# Patient Record
Sex: MALE | Race: WHITE | NOT HISPANIC OR LATINO | URBAN - METROPOLITAN AREA
[De-identification: names, ages, dates, MRNs, and addresses within clinical notes are randomized per-mention and may not be internally consistent; named-entity substitution may affect disease eponyms.]

---

## 2020-07-25 ENCOUNTER — TELEPHONE ENCOUNTER (OUTPATIENT)
Dept: URBAN - METROPOLITAN AREA CLINIC 13 | Facility: CLINIC | Age: 85
End: 2020-07-25

## 2020-07-25 RX ORDER — ESOMEPRAZOLE MAGNESIUM 40 MG
TAKE (1) CAPSULE TWICE DAILY CAPSULE,DELAYED RELEASE (ENTERIC COATED) ORAL
Qty: 180 | Refills: 2 | OUTPATIENT
End: 2016-08-29

## 2020-07-25 RX ORDER — ATORVASTATIN CALCIUM 40 MG/1
TAKE 1 TABLET DAILY TABLET, FILM COATED ORAL
Refills: 0 | OUTPATIENT
End: 2018-02-13

## 2020-07-25 RX ORDER — TRAZODONE HYDROCHLORIDE 50 MG/1
TAKE 1 TABLET AT BEDTIME TABLET ORAL
Refills: 0 | OUTPATIENT
End: 2018-02-13

## 2020-07-25 RX ORDER — UBIDECARENONE 100 MG
TAKE 1 CAPSULE TWICE DAILY CAPSULE ORAL
Refills: 0 | OUTPATIENT
End: 2016-08-29

## 2020-07-25 RX ORDER — PANTOPRAZOLE SODIUM 40 MG/1
TAKE 1 TABLET DAILY TABLET, DELAYED RELEASE ORAL
Qty: 1 | Refills: 3 | OUTPATIENT
Start: 2012-12-21 | End: 2016-08-29

## 2020-07-25 RX ORDER — DONEPEZIL HYDROCHLORIDE 10 MG/1
TAKE 1 TABLET DAILY TABLET, FILM COATED ORAL
Refills: 0 | OUTPATIENT
End: 2018-02-13

## 2020-07-25 RX ORDER — ATORVASTATIN CALCIUM 40 MG/1
TAKE 1 TABLET DAILY AS DIRECTED TABLET, FILM COATED ORAL
Qty: 30 | Refills: 0 | OUTPATIENT
Start: 2012-03-20 | End: 2013-01-21

## 2020-07-25 RX ORDER — BESIFLOXACIN HCL 0.6 %
INSTILL 1 DROP 3 TIMES DAILY SUSPENSION, DROPS(FINAL DOSAGE FORM)(ML) OPHTHALMIC (EYE)
Refills: 0 | OUTPATIENT
Start: 2014-11-12 | End: 2018-02-13

## 2020-07-25 RX ORDER — HYDROCORTISONE ACETATE 25 MG
INSERT 1 SUPPOSITORY RECTALLY AT BEDTIME AS NEEDED SUPPOSITORY, RECTAL RECTAL
Qty: 1 | Refills: 6 | OUTPATIENT
Start: 2013-07-19 | End: 2014-03-25

## 2020-07-25 RX ORDER — HYDROCHLOROTHIAZIDE 12.5 MG/1
TABLET ORAL
Qty: 90 | Refills: 0 | OUTPATIENT
Start: 2012-03-12 | End: 2018-02-13

## 2020-07-25 RX ORDER — SENNOSIDES 8.6 MG
TABLET ORAL
Refills: 0 | OUTPATIENT
End: 2012-12-21

## 2020-07-25 RX ORDER — INDOMETHACIN 50 MG/1
CAPSULE ORAL
Qty: 30 | Refills: 0 | OUTPATIENT
Start: 2013-01-14 | End: 2013-07-19

## 2020-07-25 RX ORDER — ALLOPURINOL 300 MG/1
TABLET ORAL
Qty: 90 | Refills: 0 | OUTPATIENT
Start: 2013-03-18 | End: 2018-02-13

## 2020-07-25 RX ORDER — ATORVASTATIN CALCIUM 80 MG/1
TABLET, FILM COATED ORAL
Qty: 90 | Refills: 0 | OUTPATIENT
Start: 2012-10-04 | End: 2014-03-25

## 2020-07-26 ENCOUNTER — TELEPHONE ENCOUNTER (OUTPATIENT)
Dept: URBAN - METROPOLITAN AREA CLINIC 13 | Facility: CLINIC | Age: 85
End: 2020-07-26

## 2020-07-26 RX ORDER — HYDROCHLOROTHIAZIDE 12.5 MG/1
TABLET ORAL
Qty: 90 | Refills: 0 | Status: ACTIVE | COMMUNITY
Start: 2012-03-12

## 2020-07-26 RX ORDER — MELOXICAM 7.5 MG/1
TABLET ORAL
Qty: 60 | Refills: 0 | Status: ACTIVE | COMMUNITY
Start: 2012-08-13

## 2020-07-26 RX ORDER — ZOLPIDEM TARTRATE 10 MG/1
TABLET, FILM COATED ORAL
Qty: 90 | Refills: 0 | Status: ACTIVE | COMMUNITY
Start: 2014-06-02

## 2020-07-26 RX ORDER — CLOPIDOGREL BISULFATE 75 MG
TABLET ORAL
Qty: 30 | Refills: 0 | Status: ACTIVE | COMMUNITY
Start: 2012-03-20

## 2020-07-26 RX ORDER — DESONIDE 0.5 MG/ML
LOTION TOPICAL
Qty: 59 | Refills: 0 | Status: ACTIVE | COMMUNITY
Start: 2012-07-06

## 2020-07-26 RX ORDER — LISINOPRIL AND HYDROCHLOROTHIAZIDE TABLETS 20; 12.5 MG/1; MG/1
TAKE 1 TABLET DAILY TABLET ORAL
Refills: 0 | Status: ACTIVE | COMMUNITY

## 2020-07-26 RX ORDER — CLARITHROMYCIN 500 MG/1
TABLET, FILM COATED ORAL
Qty: 28 | Refills: 0 | Status: ACTIVE | COMMUNITY
Start: 2012-10-04

## 2020-07-26 RX ORDER — AMOXICILLIN 500 MG/1
CAPSULE ORAL
Qty: 56 | Refills: 0 | Status: ACTIVE | COMMUNITY
Start: 2012-10-04

## 2020-07-26 RX ORDER — LORAZEPAM 0.5 MG/1
TABLET ORAL
Refills: 0 | Status: ACTIVE | COMMUNITY

## 2020-07-26 RX ORDER — NEBIVOLOL HYDROCHLORIDE 5 MG/1
TAKE 1 TABLET DAILY TABLET ORAL
Refills: 0 | Status: ACTIVE | COMMUNITY

## 2020-07-26 RX ORDER — ZOLPIDEM TARTRATE 10 MG/1
TABLET, FILM COATED ORAL
Qty: 30 | Refills: 0 | Status: ACTIVE | COMMUNITY
Start: 2013-01-22

## 2020-07-26 RX ORDER — ISOSORBIDE MONONITRATE 30 MG/1
TABLET, EXTENDED RELEASE ORAL
Qty: 30 | Refills: 0 | Status: ACTIVE | COMMUNITY
Start: 2012-03-20

## 2020-07-26 RX ORDER — DUREZOL 0.5 MG/ML
EMULSION OPHTHALMIC
Qty: 5 | Refills: 0 | Status: ACTIVE | COMMUNITY
Start: 2014-11-12

## 2020-07-26 RX ORDER — LANSOPRAZOLE 30 MG/1
CAPSULE, DELAYED RELEASE ORAL
Qty: 28 | Refills: 0 | Status: ACTIVE | COMMUNITY
Start: 2012-10-04

## 2020-07-26 RX ORDER — ERYTHROMYCIN 20 MG/G
GEL TOPICAL
Qty: 60 | Refills: 0 | Status: ACTIVE | COMMUNITY
Start: 2014-12-08

## 2020-07-26 RX ORDER — ZOLPIDEM TARTRATE 10 MG/1
TABLET, FILM COATED ORAL
Qty: 30 | Refills: 0 | Status: ACTIVE | COMMUNITY
Start: 2013-05-20

## 2020-07-26 RX ORDER — ZOLPIDEM TARTRATE 10 MG/1
TAKE 1 TABLET AT BEDTIME AS NEEDED TABLET, FILM COATED ORAL
Refills: 0 | Status: ACTIVE | COMMUNITY

## 2020-07-26 RX ORDER — DOXYCYCLINE HYCLATE 100 MG/1
TABLET ORAL
Qty: 20 | Refills: 0 | Status: ACTIVE | COMMUNITY
Start: 2013-12-15

## 2020-07-26 RX ORDER — SERTRALINE HCL 50 MG
TAKE 1 TABLET DAILY AS DIRECTED TABLET ORAL
Refills: 0 | Status: ACTIVE | COMMUNITY

## 2020-07-26 RX ORDER — ZOLPIDEM TARTRATE 10 MG/1
TABLET, FILM COATED ORAL
Qty: 30 | Refills: 0 | Status: ACTIVE | COMMUNITY
Start: 2011-09-16

## 2020-07-26 RX ORDER — CEPHALEXIN 500 MG/1
CAPSULE ORAL
Qty: 40 | Refills: 0 | Status: ACTIVE | COMMUNITY
Start: 2014-01-27

## 2020-07-26 RX ORDER — CEPHALEXIN 500 MG/1
CAPSULE ORAL
Qty: 30 | Refills: 0 | Status: ACTIVE | COMMUNITY
Start: 2014-12-08

## 2020-07-26 RX ORDER — DONEPEZIL HYDROCHLORIDE 10 MG/1
TAKE 1 TABLET DAILY AS DIRECTED TABLET, FILM COATED ORAL
Refills: 0 | Status: ACTIVE | COMMUNITY

## 2020-07-26 RX ORDER — HYDROCODONE BITARTRATE AND ACETAMINOPHEN 5; 325 MG/1; MG/1
TABLET ORAL
Qty: 15 | Refills: 0 | Status: ACTIVE | COMMUNITY
Start: 2014-01-27

## 2020-07-26 RX ORDER — CALCIUM POLYCARBOPHIL 625 MG
TABLET ORAL
Refills: 0 | Status: ACTIVE | COMMUNITY

## 2020-07-26 RX ORDER — ATORVASTATIN CALCIUM 80 MG/1
TABLET, FILM COATED ORAL
Qty: 90 | Refills: 0 | Status: ACTIVE | COMMUNITY
Start: 2012-10-04

## 2020-07-26 RX ORDER — FLUOROURACIL 50 MG/G
CREAM TOPICAL
Qty: 40 | Refills: 0 | Status: ACTIVE | COMMUNITY
Start: 2014-07-09

## 2020-07-26 RX ORDER — ZOLPIDEM TARTRATE 10 MG/1
TABLET, FILM COATED ORAL
Qty: 90 | Refills: 0 | Status: ACTIVE | COMMUNITY
Start: 2014-02-03

## 2021-06-18 ENCOUNTER — OFFICE VISIT (OUTPATIENT)
Dept: URBAN - METROPOLITAN AREA CLINIC 113 | Facility: CLINIC | Age: 86
End: 2021-06-18
Payer: MEDICARE

## 2021-06-18 ENCOUNTER — WEB ENCOUNTER (OUTPATIENT)
Dept: URBAN - METROPOLITAN AREA CLINIC 113 | Facility: CLINIC | Age: 86
End: 2021-06-18

## 2021-06-18 VITALS
WEIGHT: 129.5 LBS | HEART RATE: 56 BPM | SYSTOLIC BLOOD PRESSURE: 99 MMHG | BODY MASS INDEX: 19.63 KG/M2 | HEIGHT: 68 IN | TEMPERATURE: 97.3 F | DIASTOLIC BLOOD PRESSURE: 58 MMHG | RESPIRATION RATE: 20 BRPM

## 2021-06-18 DIAGNOSIS — R63.4 WEIGHT LOSS: ICD-10-CM

## 2021-06-18 DIAGNOSIS — R10.13 EPIGASTRIC ABDOMINAL PAIN: ICD-10-CM

## 2021-06-18 DIAGNOSIS — R68.81 EARLY SATIETY: ICD-10-CM

## 2021-06-18 PROCEDURE — 74177 CT ABD & PELVIS W/CONTRAST: CPT | Performed by: INTERNAL MEDICINE

## 2021-06-18 PROCEDURE — 99204 OFFICE O/P NEW MOD 45 MIN: CPT | Performed by: INTERNAL MEDICINE

## 2021-06-18 RX ORDER — FLUOROURACIL 50 MG/G
CREAM TOPICAL
Qty: 40 | Refills: 0 | COMMUNITY
Start: 2014-07-09

## 2021-06-18 RX ORDER — CLARITHROMYCIN 500 MG/1
TABLET, FILM COATED ORAL
Qty: 28 | Refills: 0 | COMMUNITY
Start: 2012-10-04

## 2021-06-18 RX ORDER — CITALOPRAM HYDROBROMIDE 10 MG/1
1 TABLET TABLET, FILM COATED ORAL ONCE A DAY
Status: ACTIVE | COMMUNITY

## 2021-06-18 RX ORDER — ISOSORBIDE MONONITRATE 30 MG/1
TABLET, EXTENDED RELEASE ORAL
Qty: 30 | Refills: 0 | COMMUNITY
Start: 2012-03-20

## 2021-06-18 RX ORDER — ZOLPIDEM TARTRATE 10 MG/1
TAKE 1 TABLET AT BEDTIME AS NEEDED TABLET, FILM COATED ORAL
Refills: 0 | Status: ACTIVE | COMMUNITY

## 2021-06-18 RX ORDER — DOXYCYCLINE HYCLATE 100 MG/1
TABLET ORAL
Qty: 20 | Refills: 0 | COMMUNITY
Start: 2013-12-15

## 2021-06-18 RX ORDER — CLOPIDOGREL BISULFATE 75 MG
TABLET ORAL
Qty: 30 | Refills: 0 | COMMUNITY
Start: 2012-03-20

## 2021-06-18 RX ORDER — LANSOPRAZOLE 30 MG/1
CAPSULE, DELAYED RELEASE ORAL
Qty: 28 | Refills: 0 | COMMUNITY
Start: 2012-10-04

## 2021-06-18 RX ORDER — CALCIUM POLYCARBOPHIL 625 MG
2 TABLETS AS NEEDED TABLET ORAL THREE TIMES A DAY
Status: ACTIVE | COMMUNITY

## 2021-06-18 RX ORDER — ZOLPIDEM TARTRATE 10 MG/1
TABLET, FILM COATED ORAL
Qty: 30 | Refills: 0 | COMMUNITY
Start: 2011-09-16

## 2021-06-18 RX ORDER — DESONIDE 0.5 MG/ML
LOTION TOPICAL
Qty: 59 | Refills: 0 | COMMUNITY
Start: 2012-07-06

## 2021-06-18 RX ORDER — ATORVASTATIN CALCIUM 80 MG/1
TABLET, FILM COATED ORAL
Qty: 90 | Refills: 0 | COMMUNITY
Start: 2012-10-04

## 2021-06-18 RX ORDER — ERYTHROMYCIN 20 MG/G
GEL TOPICAL
Qty: 60 | Refills: 0 | COMMUNITY
Start: 2014-12-08

## 2021-06-18 RX ORDER — LISINOPRIL AND HYDROCHLOROTHIAZIDE TABLETS 20; 12.5 MG/1; MG/1
TAKE 1 TABLET DAILY TABLET ORAL
Refills: 0 | Status: ACTIVE | COMMUNITY

## 2021-06-18 RX ORDER — DUREZOL 0.5 MG/ML
EMULSION OPHTHALMIC
Qty: 5 | Refills: 0 | COMMUNITY
Start: 2014-11-12

## 2021-06-18 RX ORDER — LORAZEPAM 0.5 MG/1
TABLET ORAL
Refills: 0 | COMMUNITY

## 2021-06-18 RX ORDER — HYDROCODONE BITARTRATE AND ACETAMINOPHEN 5; 325 MG/1; MG/1
TABLET ORAL
Qty: 15 | Refills: 0 | COMMUNITY
Start: 2014-01-27

## 2021-06-18 RX ORDER — CEPHALEXIN 500 MG/1
CAPSULE ORAL
Qty: 40 | Refills: 0 | COMMUNITY
Start: 2014-01-27

## 2021-06-18 RX ORDER — AMLODIPINE BESYLATE 10 MG/1
1 TABLET TABLET ORAL ONCE A DAY
Status: ACTIVE | COMMUNITY

## 2021-06-18 RX ORDER — SERTRALINE HCL 50 MG
TAKE 1 TABLET DAILY AS DIRECTED TABLET ORAL
Refills: 0 | COMMUNITY

## 2021-06-18 RX ORDER — NEBIVOLOL HYDROCHLORIDE 5 MG/1
TAKE 1 TABLET DAILY TABLET ORAL
Refills: 0 | Status: ACTIVE | COMMUNITY

## 2021-06-18 RX ORDER — CALCIUM POLYCARBOPHIL 625 MG
TABLET ORAL
Refills: 0 | COMMUNITY

## 2021-06-18 RX ORDER — DONEPEZIL HYDROCHLORIDE 10 MG/1
TAKE 1 TABLET DAILY AS DIRECTED TABLET, FILM COATED ORAL
Refills: 0 | Status: ACTIVE | COMMUNITY

## 2021-06-18 RX ORDER — AMOXICILLIN 500 MG/1
CAPSULE ORAL
Qty: 56 | Refills: 0 | COMMUNITY
Start: 2012-10-04

## 2021-06-18 RX ORDER — HYDROCHLOROTHIAZIDE 12.5 MG/1
TABLET ORAL
Qty: 90 | Refills: 0 | COMMUNITY
Start: 2012-03-12

## 2021-06-18 RX ORDER — MELOXICAM 7.5 MG/1
TABLET ORAL
Qty: 60 | Refills: 0 | COMMUNITY
Start: 2012-08-13

## 2021-06-18 NOTE — HPI-TODAY'S VISIT:
86-year-old male with history of GERD, diverticulosis coli, benign hepatic hemangiomas, and mild anemia, presenting for unintentional weight loss. He was last seen in the office in 2018.  GERD symptoms were well managed with as needed Zantac.  He was to continue daily fiber supplementation due to history of moderate diverticulosis.  Regarding colon cancer screening further colonoscopy examinations were deferred due to his advanced age.  There was no personal history of colon polyps or family history of GI malignancy.  He is down 30 pounds since we last saw him in 2018. In comparison to notes from his PCP, he is down 15 pounds since January 2021. He reports that he gets full easily. He does not have interest in eating food. He denies any dysgeusia. He ate half of a donut this morning, and was full. There is no dysphagia. No heartburn. He complains of upper abdominal pain when he eats. He complains of a dull pain in the upper abdomen. The pain is unchanged by passing gas or having a bowel movement. The pain is intermittent and is not always after meals. He is eating a great deal of fruit, which results in increasing diarrhea. There is no nausea or vomiting currently. He did have some vomiting in March when his symptoms first begain. He has bowel movements daily, 2 to 3 times per day. No red blood per rectum.

## 2021-06-19 LAB
A/G RATIO: 1
ALBUMIN: 2.9
ALKALINE PHOSPHATASE: 81
ALT (SGPT): 7
AMYLASE: 88
AST (SGOT): 16
BASO (ABSOLUTE): 0.1
BASOS: 1
BILIRUBIN, TOTAL: <0.2
BUN/CREATININE RATIO: 15
BUN: 32
C-REACTIVE PROTEIN, QUANT: 109
CALCIUM: 9
CARBON DIOXIDE, TOTAL: 25
CHLORIDE: 102
CREATININE: 2.1
EGFR IF AFRICN AM: 32
EGFR IF NONAFRICN AM: 28
EOS (ABSOLUTE): 0.1
EOS: 1
GLOBULIN, TOTAL: 3
GLUCOSE: 83
HEMATOCRIT: 28.8
HEMATOLOGY COMMENTS:: (no result)
HEMOGLOBIN: 8.7
IMMATURE CELLS: (no result)
IMMATURE GRANS (ABS): 0.1
IMMATURE GRANULOCYTES: 1
LIPASE: 49
LYMPHS (ABSOLUTE): 0.8
LYMPHS: 8
MCH: 23.6
MCHC: 30.2
MCV: 78
MONOCYTES(ABSOLUTE): 1.1
MONOCYTES: 11
NEUTROPHILS (ABSOLUTE): 7.6
NEUTROPHILS: 78
NRBC: (no result)
PLATELETS: 426
POTASSIUM: 4.7
PROTEIN, TOTAL: 5.9
RBC: 3.68
RDW: 14.6
SODIUM: 139
T4,FREE(DIRECT): 1.13
TSH: 1.44
WBC: 9.8

## 2021-06-22 ENCOUNTER — TELEPHONE ENCOUNTER (OUTPATIENT)
Dept: URBAN - METROPOLITAN AREA CLINIC 113 | Facility: CLINIC | Age: 86
End: 2021-06-22

## 2021-07-30 ENCOUNTER — OFFICE VISIT (OUTPATIENT)
Dept: URBAN - METROPOLITAN AREA CLINIC 113 | Facility: CLINIC | Age: 86
End: 2021-07-30
Payer: MEDICARE

## 2021-07-30 ENCOUNTER — DASHBOARD ENCOUNTERS (OUTPATIENT)
Age: 86
End: 2021-07-30

## 2021-07-30 ENCOUNTER — WEB ENCOUNTER (OUTPATIENT)
Dept: URBAN - METROPOLITAN AREA CLINIC 113 | Facility: CLINIC | Age: 86
End: 2021-07-30

## 2021-07-30 VITALS
TEMPERATURE: 97.5 F | HEIGHT: 68 IN | SYSTOLIC BLOOD PRESSURE: 132 MMHG | HEART RATE: 67 BPM | RESPIRATION RATE: 18 BRPM | WEIGHT: 127 LBS | DIASTOLIC BLOOD PRESSURE: 68 MMHG | BODY MASS INDEX: 19.25 KG/M2

## 2021-07-30 DIAGNOSIS — R10.13 EPIGASTRIC ABDOMINAL PAIN: ICD-10-CM

## 2021-07-30 DIAGNOSIS — R68.81 EARLY SATIETY: ICD-10-CM

## 2021-07-30 DIAGNOSIS — D64.9 ANEMIA, UNSPECIFIED TYPE: ICD-10-CM

## 2021-07-30 DIAGNOSIS — R63.4 WEIGHT LOSS: ICD-10-CM

## 2021-07-30 DIAGNOSIS — R93.5 ABNORMAL ABDOMINAL CT SCAN: ICD-10-CM

## 2021-07-30 PROBLEM — 15634181000119107: Status: ACTIVE | Noted: 2021-06-22

## 2021-07-30 PROBLEM — 89362005: Status: ACTIVE | Noted: 2021-06-18

## 2021-07-30 PROBLEM — 79922009: Status: ACTIVE | Noted: 2021-06-18

## 2021-07-30 PROBLEM — 442076002: Status: ACTIVE | Noted: 2021-06-18

## 2021-07-30 PROBLEM — 271737000: Status: ACTIVE | Noted: 2021-06-22

## 2021-07-30 PROCEDURE — 99213 OFFICE O/P EST LOW 20 MIN: CPT | Performed by: INTERNAL MEDICINE

## 2021-07-30 RX ORDER — HYDROCHLOROTHIAZIDE 12.5 MG/1
TABLET ORAL
Qty: 90 | Refills: 0 | COMMUNITY
Start: 2012-03-12

## 2021-07-30 RX ORDER — DONEPEZIL HYDROCHLORIDE 10 MG/1
TAKE 1 TABLET DAILY AS DIRECTED TABLET, FILM COATED ORAL
Refills: 0 | Status: ACTIVE | COMMUNITY

## 2021-07-30 RX ORDER — LISINOPRIL AND HYDROCHLOROTHIAZIDE TABLETS 20; 12.5 MG/1; MG/1
TAKE 1 TABLET DAILY TABLET ORAL
Refills: 0 | Status: ACTIVE | COMMUNITY

## 2021-07-30 RX ORDER — ISOSORBIDE MONONITRATE 30 MG/1
TABLET, EXTENDED RELEASE ORAL
Qty: 30 | Refills: 0 | COMMUNITY
Start: 2012-03-20

## 2021-07-30 RX ORDER — CEPHALEXIN 500 MG/1
CAPSULE ORAL
Qty: 40 | Refills: 0 | COMMUNITY
Start: 2014-01-27

## 2021-07-30 RX ORDER — DESONIDE 0.5 MG/ML
LOTION TOPICAL
Qty: 59 | Refills: 0 | COMMUNITY
Start: 2012-07-06

## 2021-07-30 RX ORDER — CALCIUM POLYCARBOPHIL 625 MG
2 TABLETS AS NEEDED TABLET ORAL THREE TIMES A DAY
Status: ACTIVE | COMMUNITY

## 2021-07-30 RX ORDER — LORAZEPAM 0.5 MG/1
TABLET ORAL
Refills: 0 | COMMUNITY

## 2021-07-30 RX ORDER — DUREZOL 0.5 MG/ML
EMULSION OPHTHALMIC
Qty: 5 | Refills: 0 | COMMUNITY
Start: 2014-11-12

## 2021-07-30 RX ORDER — CALCIUM POLYCARBOPHIL 625 MG
TABLET ORAL
Refills: 0 | COMMUNITY

## 2021-07-30 RX ORDER — DOXYCYCLINE HYCLATE 100 MG/1
TABLET ORAL
Qty: 20 | Refills: 0 | COMMUNITY
Start: 2013-12-15

## 2021-07-30 RX ORDER — FLUOROURACIL 50 MG/G
CREAM TOPICAL
Qty: 40 | Refills: 0 | COMMUNITY
Start: 2014-07-09

## 2021-07-30 RX ORDER — AMOXICILLIN 500 MG/1
CAPSULE ORAL
Qty: 56 | Refills: 0 | COMMUNITY
Start: 2012-10-04

## 2021-07-30 RX ORDER — ZOLPIDEM TARTRATE 10 MG/1
TABLET, FILM COATED ORAL
Qty: 30 | Refills: 0 | COMMUNITY
Start: 2011-09-16

## 2021-07-30 RX ORDER — AMLODIPINE BESYLATE 10 MG/1
1 TABLET TABLET ORAL ONCE A DAY
Status: ACTIVE | COMMUNITY

## 2021-07-30 RX ORDER — ERYTHROMYCIN 20 MG/G
GEL TOPICAL
Qty: 60 | Refills: 0 | COMMUNITY
Start: 2014-12-08

## 2021-07-30 RX ORDER — LANSOPRAZOLE 30 MG/1
CAPSULE, DELAYED RELEASE ORAL
Qty: 28 | Refills: 0 | COMMUNITY
Start: 2012-10-04

## 2021-07-30 RX ORDER — AXITINIB 5 MG/1
1 TABLET TABLET, FILM COATED ORAL TWICE A DAY
Status: ACTIVE | COMMUNITY

## 2021-07-30 RX ORDER — CITALOPRAM HYDROBROMIDE 10 MG/1
1 TABLET TABLET, FILM COATED ORAL ONCE A DAY
Status: ACTIVE | COMMUNITY

## 2021-07-30 RX ORDER — MELOXICAM 7.5 MG/1
TABLET ORAL
Qty: 60 | Refills: 0 | COMMUNITY
Start: 2012-08-13

## 2021-07-30 RX ORDER — CLARITHROMYCIN 500 MG/1
TABLET, FILM COATED ORAL
Qty: 28 | Refills: 0 | COMMUNITY
Start: 2012-10-04

## 2021-07-30 RX ORDER — SERTRALINE HCL 50 MG
TAKE 1 TABLET DAILY AS DIRECTED TABLET ORAL
Refills: 0 | COMMUNITY

## 2021-07-30 RX ORDER — ATORVASTATIN CALCIUM 80 MG/1
TABLET, FILM COATED ORAL
Qty: 90 | Refills: 0 | COMMUNITY
Start: 2012-10-04

## 2021-07-30 RX ORDER — ZOLPIDEM TARTRATE 10 MG/1
TAKE 1 TABLET AT BEDTIME AS NEEDED TABLET, FILM COATED ORAL
Refills: 0 | Status: ACTIVE | COMMUNITY

## 2021-07-30 RX ORDER — NEBIVOLOL HYDROCHLORIDE 5 MG/1
TAKE 1 TABLET DAILY TABLET ORAL
Refills: 0 | Status: ACTIVE | COMMUNITY

## 2021-07-30 RX ORDER — CLOPIDOGREL BISULFATE 75 MG
TABLET ORAL
Qty: 30 | Refills: 0 | COMMUNITY
Start: 2012-03-20

## 2021-07-30 RX ORDER — HYDROCODONE BITARTRATE AND ACETAMINOPHEN 5; 325 MG/1; MG/1
TABLET ORAL
Qty: 15 | Refills: 0 | COMMUNITY
Start: 2014-01-27

## 2021-07-30 RX ORDER — PEMBROLIZUMAB 25 MG/ML
AS DIRECTED INJECTION, SOLUTION INTRAVENOUS
Status: ACTIVE | COMMUNITY

## 2021-07-30 NOTE — HPI-TODAY'S VISIT:
This is an 86-year-old male with a history of GERD, diverticulosis coli, benign hepatic hemangiomas, mild anemia, presenting for follow-up regarding unintentional weight loss after labs and radiology. He was last seen in the office on 6/18/2021.  At that time, he complained of a 30 pound weight loss since his visit last in 2018.  In comparison to notes from his PCP, he had had a weight loss of 15 pounds since January 2021.  He admitted early satiety and upper abdominal discomfort.  His abdominal examination was benign.  I recommended labs to include CBC, CMP, TSH, CRP, amylase and lipase.  I also recommended a CT of the abdomen and pelvis with IV contrast to evaluate for acute intra-abdominal pathology. Labs 6/18/2021:Amylase 88, lipase 49, , TSH 1.44, BUN 32, creatinine 2.1, sodium 139, potassium 4.7, albumin 2.9, T bili 0.2, ALP 81, AST 16, ALT 7, WBC 9.8, hemoglobin 8.7, hematocrit 28.8, MCV 78, platelets 426 CT of the abdomen and pelvis without contrast was performed on 6/22/2021.  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  Suspected abdominal retroperitoneal metastatic adenopathy.  There was possible hepatic and adrenal metastatic disease.  Probable lower thoracic pericardiac isauro metastasis.  2 indeterminate sclerotic skeletal lesions with blastic skeletal metastatic disease not excluded.  Significantly complex 9 cm right renal lesion could potentially be a primary site.  Evaluation hampered by lack of intravenous contrast.  Scheduled IV contrast canceled secondary to a creatinine of 2.4 obtained prior to the examination.  Small bilateral nonspecific pleural effusions.  Further evaluation using skull to thigh PET scan was recommended if appropriate. A PET/CT was performed 7/2/2021.  Significantly hypermetabolic activity fusing to the mediastinal, hilar and abdominopelvic lymphadenopathy as well as diffuse peritoneal carcinomatosis with numerous peritoneal soft tissue implants.  There was also hypermetabolic right superior renal pole lesion measuring up to 7.6 cm.  Together, findings may represent metastatic renal cell carcinoma, or diffuse metastatic activity from an unknown primary.  He was referred to oncology. He saw Dr. Guerrero on 7/13/2021.  He was started on palliative chemotherapy with Keytruda.  He tells me that he feels pretty well today. He has had one infusion of Keytruda. Otherwise he is taking Inlyta 5 mg, 1 tablet twice daily. His second Keytruda infusion is  scheduled for Tuesday. He tells me that he was told there is a 15 month average life expectancy. The treatment is not a cure. It is not going to put him in remission. It will hopefully extend his life. His wife tells me that there is a 65% chance that it will be successful.   His appetite is improving somewhat, which does help to improve his strength. He is doing what he wants to do during the day.